# Patient Record
Sex: MALE | Race: WHITE | NOT HISPANIC OR LATINO | Employment: FULL TIME | URBAN - METROPOLITAN AREA
[De-identification: names, ages, dates, MRNs, and addresses within clinical notes are randomized per-mention and may not be internally consistent; named-entity substitution may affect disease eponyms.]

---

## 2020-05-28 ENCOUNTER — HOSPITAL ENCOUNTER (EMERGENCY)
Facility: MEDICAL CENTER | Age: 30
End: 2020-05-28
Attending: EMERGENCY MEDICINE

## 2020-05-28 VITALS
OXYGEN SATURATION: 100 % | BODY MASS INDEX: 23.66 KG/M2 | TEMPERATURE: 98.2 F | HEIGHT: 75 IN | SYSTOLIC BLOOD PRESSURE: 141 MMHG | HEART RATE: 97 BPM | RESPIRATION RATE: 16 BRPM | WEIGHT: 190.26 LBS | DIASTOLIC BLOOD PRESSURE: 71 MMHG

## 2020-05-28 DIAGNOSIS — L55.9 SUNBURN: ICD-10-CM

## 2020-05-28 PROCEDURE — 99282 EMERGENCY DEPT VISIT SF MDM: CPT

## 2020-05-28 NOTE — Clinical Note
Se Estevez was seen and treated in our emergency department on 5/28/2020.  He may return to work on 06/02/2020.       If you have any questions or concerns, please don't hesitate to call.      Josias Myers M.D.

## 2020-05-28 NOTE — ED TRIAGE NOTES
"Pt ambulatory to triage. Pt woke up this morning and noticed pain/ swelling in bilateral feet. Pt has significant sun burn and went to  this morning where he was dignosed with sun poisoning. He was told if swelling in his feet increased to come to ED. Describes pain as \"pins and needles\" when attempting to ambulated.       Pt/staff masked and in appropriate PPE during encounter.  Pt denies fever/travel or being in contact with anyone testing positive for COVID/Corona.  Explained to pt triage process, made pt aware to tell this RN/staff of any changes/concerns, pt verbalized understanding of process and instructions given. Pt to ER lobby.  "

## 2020-05-28 NOTE — Clinical Note
Se Estevez was seen and treated in our emergency department on 5/28/2020.  He may return to work on 06/02/2020.       If you have any questions or concerns, please don't hesitate to call.      Josias Meyrs M.D.

## 2020-05-29 NOTE — ED PROVIDER NOTES
"ED Provider Note    CHIEF COMPLAINT  Chief Complaint   Patient presents with   • Sent from Urgent Care   • Foot Swelling     bilateral feet x5 hours       HPI  Se Estevez is a 29 y.o. male who presents with sunburn.  He was sent from urgent care.  He has very faint swelling to his ankles and the urgent care provider instructed him to seek emergency care should he have any worsening.  The patient denies any blisters.  He states that he sat out in the sun for for 5 hours yesterday.  He states that the redness and pain is only on the anterior aspect of his body.  Was wearing a sun hat so did not involve his face.  He states that he works in [employer] and cannot wear boots currently due to pain.    REVIEW OF SYSTEMS  See HPI for further details. All other systems are negative.       SOCIAL HISTORY  Social History     Tobacco Use   • Smoking status: Current Every Day Smoker     Packs/day: 0.25   • Smokeless tobacco: Never Used   Substance and Sexual Activity   • Alcohol use: Not Currently   • Drug use: Not Currently   • Sexual activity: Not on file       SURGICAL HISTORY  patient denies any surgical history    CURRENT MEDICATIONS  Home Medications    **Home medications have not yet been reviewed for this encounter**         ALLERGIES  Not on File    PHYSICAL EXAM  VITAL SIGNS: /79   Pulse (!) 110   Temp 36.5 °C (97.7 °F) (Temporal)   Resp 18   Ht 1.905 m (6' 3\")   Wt 86.3 kg (190 lb 4.1 oz)   SpO2 93%   BMI 23.78 kg/m²   Pulse ox interpretation: I interpret this pulse ox as normal.  Constitutional: Alert in no apparent distress.  HENT: No signs of trauma, Bilateral external ears normal, Nose normal.   Eyes: Pupils are equal and reactive, Conjunctiva normal, Non-icteric.   Neck: Normal range of motion, No tenderness, Supple, No stridor.   Cardiovascular: Regular rate and rhythm.   Thorax & Lungs: Normal breath sounds, No respiratory distress  Skin: Warm, Dry, no blisters, No rash.  Significant " "erythema to the anterior chest and upper and lower extremities.  Extremities: Intact distal pulses, trace ankle edema, diffuse tenderness, No cyanosis  Neurologic: Alert, No focal deficits noted.       DIAGNOSTIC STUDIES / PROCEDURES      COURSE & MEDICAL DECISION MAKING    Medications - No data to display    Pertinent Labs & Imaging studies reviewed. (See chart for details)  29 y.o. male presenting with sunburn.  No blisters.  Appears to be typical sunburn.  Recommending supportive measures.  No indication for further emergency care at this time.  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    The patient was instructed to follow-up with primary care physician for further management.  To return immediately for any worsening symptoms or development of any other concerning signs or symptoms. The patient verbalizes understanding in their own words.    /71   Pulse 97   Temp 36.8 °C (98.2 °F)   Resp 16   Ht 1.905 m (6' 3\")   Wt 86.3 kg (190 lb 4.1 oz)   SpO2 100%   BMI 23.78 kg/m²     The patient was referred to primary care where they will receive further BP management.      [facility], Emergency Dept  [facility] [facility] [address] [phone]    As needed, If symptoms worsen    Primary care doctor    Schedule an appointment as soon as possible for a visit         FINAL IMPRESSION  1. Sunburn            Electronically signed by: Josias Myers M.D., 5/28/2020 5:00 PM    "

## 2020-05-29 NOTE — ED NOTES
Discharge instructions reviewed with pt. All questions answered and pt verbalizes understanding. Pt is ambulaory to exit

## 2020-11-10 ENCOUNTER — HOSPITAL ENCOUNTER (EMERGENCY)
Facility: MEDICAL CENTER | Age: 30
End: 2020-11-10
Attending: EMERGENCY MEDICINE

## 2020-11-10 VITALS
SYSTOLIC BLOOD PRESSURE: 108 MMHG | RESPIRATION RATE: 14 BRPM | BODY MASS INDEX: 23.82 KG/M2 | OXYGEN SATURATION: 98 % | HEIGHT: 75 IN | TEMPERATURE: 97.6 F | DIASTOLIC BLOOD PRESSURE: 71 MMHG | WEIGHT: 191.58 LBS | HEART RATE: 88 BPM

## 2020-11-10 DIAGNOSIS — M62.838 MUSCLE SPASM: ICD-10-CM

## 2020-11-10 DIAGNOSIS — M54.32 SCIATICA OF LEFT SIDE: ICD-10-CM

## 2020-11-10 DIAGNOSIS — S39.012A STRAIN OF LUMBAR REGION, INITIAL ENCOUNTER: ICD-10-CM

## 2020-11-10 PROCEDURE — A9270 NON-COVERED ITEM OR SERVICE: HCPCS | Performed by: EMERGENCY MEDICINE

## 2020-11-10 PROCEDURE — 700102 HCHG RX REV CODE 250 W/ 637 OVERRIDE(OP): Performed by: EMERGENCY MEDICINE

## 2020-11-10 PROCEDURE — 99283 EMERGENCY DEPT VISIT LOW MDM: CPT

## 2020-11-10 RX ORDER — CYCLOBENZAPRINE HCL 10 MG
10 TABLET ORAL 3 TIMES DAILY PRN
Qty: 30 TAB | Refills: 0 | Status: SHIPPED | OUTPATIENT
Start: 2020-11-10

## 2020-11-10 RX ORDER — DIAZEPAM 5 MG/1
5 TABLET ORAL ONCE
Status: COMPLETED | OUTPATIENT
Start: 2020-11-10 | End: 2020-11-10

## 2020-11-10 RX ORDER — IBUPROFEN 600 MG/1
600 TABLET ORAL ONCE
Status: COMPLETED | OUTPATIENT
Start: 2020-11-10 | End: 2020-11-10

## 2020-11-10 RX ORDER — LIDOCAINE 50 MG/G
1 PATCH TOPICAL EVERY 24 HOURS
Qty: 10 PATCH | Refills: 0 | Status: SHIPPED | OUTPATIENT
Start: 2020-11-10

## 2020-11-10 RX ORDER — IBUPROFEN 600 MG/1
600 TABLET ORAL EVERY 6 HOURS PRN
Qty: 30 TAB | Refills: 0 | Status: SHIPPED | OUTPATIENT
Start: 2020-11-10

## 2020-11-10 RX ADMIN — IBUPROFEN 600 MG: 600 TABLET, FILM COATED ORAL at 21:20

## 2020-11-10 RX ADMIN — DIAZEPAM 5 MG: 5 TABLET ORAL at 21:20

## 2020-11-10 ASSESSMENT — LIFESTYLE VARIABLES: DO YOU DRINK ALCOHOL: NO

## 2020-11-10 ASSESSMENT — PAIN DESCRIPTION - PAIN TYPE
TYPE: ACUTE PAIN
TYPE: ACUTE PAIN

## 2020-11-10 NOTE — Clinical Note
Se Estevez was seen and treated in our emergency department on 11/10/2020.  He may return to work on 11/12/2020.  Recommend light duty for one week.      If you have any questions or concerns, please don't hesitate to call.      Jozef Cummings M.D.

## 2020-11-11 NOTE — ED TRIAGE NOTES
Se Estevez  30 y.o.  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

## 2020-11-11 NOTE — ED PROVIDER NOTES
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   HPI  Se Estevez is a 30 y.o. male who presents to the Emergency Department with back pain.  The patient notes having intermittent back pain for the past few years.  He moved here from [facility].  He had a full work-up there to include MRI, x-rays.  He was evaluated by several neurosurgeons and there was some discussion about exploring surgery but the patient declined this and went for physical therapy.  He notes doing physical therapy with significant improvement in his symptoms.  He last had a flare about 6 months ago.  Most recent flareup occurred 48 hours ago when the patient was getting up from the couch.  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  He notes he has some spasming and burning pain in the left side of his back that radiates down towards his foot, it also occasionally radiates up towards his mid back.  It is worse with movement and slightly alleviated with rest.  He has been doing home stretching, taking Naprosyn as well as using hot and cold packs and lidocaine rubs.  He notes some improvement in the symptoms.  He is hoping to get a day off of work and some light duty to help mitigate the pain.  He does smoke cigarettes.    He denies any numbness or focal weakness.  He denies any incontinence.    REVIEW OF SYSTEMS  See  "HPI,   Remainder of ROS negative.     PAST MEDICAL HISTORY   Denies    SURGICAL HISTORY  patient denies any surgical history    SOCIAL HISTORY  Social History     Tobacco Use   • Smoking status: Current Every Day Smoker     Packs/day: 0.25   • Smokeless tobacco: Never Used   Substance Use Topics   • Alcohol use: Not Currently   • Drug use: Not Currently      Social History     Substance and Sexual Activity   Drug Use Not Currently       FAMILY HISTORY  History reviewed. No pertinent family history.    CURRENT MEDICATIONS  Reviewed.  See Encounter Summary.     ALLERGIES  Allergies   Allergen Reactions   • Amoxicillin Hives   • Augmentin Hives       PHYSICAL EXAM  VITAL SIGNS: /71   Pulse 88   Temp 36.4 °C (97.6 °F) (Temporal)   Resp 14   Ht 1.905 m (6' 3\")   Wt 86.9 kg (191 lb 9.3 oz)   SpO2 98%   BMI 23.95 kg/m²   Constitutional: Awake, alert in no apparent distress.  HENT: Normocephalic, Bilateral external ears normal. Nose normal.   Eyes: Conjunctiva normal, non-icteric, EOMI.    Thorax & Lungs: Easy unlabored respirations, Clear to ascultation bilaterally.  Cardiovascular: Regular rate, Regular rhythm, No murmurs, rubs or gallops. Bilateral pulses symmetrical.   Abdomen:  Soft, nontender, nondistended, normal active bowel sounds.   :    Skin: Visualized skin is  Dry, No erythema, No rash.   Musculoskeletal:   No cyanosis, clubbing or edema. No leg asymmetry.  Back is normal in appearance without any midline tenderness or step-off.  Neurologic: Alert, Grossly non-focal.  No saddle paresthesias, patient able to ambulate though he does have a slight limp in his gait as he is favoring the right leg.  Psychiatric: Normal affect, Normal mood  Lymphatic:  No cervical LAD        RADIOLOGY  No orders to display         COURSE & MEDICAL DECISION MAKING  Pertinent Labs & Imaging studies reviewed. (See chart for details)      8:47 PM - Medical record reviewed, patient seen and examined at bedside.    Decision " Making:  This is a pleasant well-appearing 30-year-old male who presents with back pain.  There is no significant antecedent trauma. Imaging is not indicated at this time. I do not suspect acute spinal fracture. There is no evidence of cauda equina. The patient is neurologically intact. The abdomen is soft without abdominal masses. I do not suspect AAA. There are no urinary symptoms or CVA tenderness. I do not suspect renal colic or pyelonephritis.    I counseled the patient extensively on the typical course for back pain. I recommend anti-inflammatories and physical therapy.      Discharge Medications:  New Prescriptions    CYCLOBENZAPRINE (FLEXERIL) 10 MG TAB    Take 1 Tab by mouth 3 times a day as needed.    IBUPROFEN (MOTRIN) 600 MG TAB    Take 1 Tab by mouth every 6 hours as needed.    LIDOCAINE (LIDODERM) 5 % PATCH    Apply 1 Patch to skin as directed every 24 hours.       The patient was discharged home (see d/c instructions) was told to return immediately for any signs or symptoms listed, or any worsening at all.  The patient verbally agreed to the discharge precautions and follow-up plan which is documented in EPIC.        FINAL IMPRESSION  1. Strain of lumbar region, initial encounter    2. Sciatica of left side    3. Muscle spasm

## 2020-11-11 NOTE — ED NOTES
Se Estevez discharged via ambulation to lobby, friend reportedly driving home,  Discharge instructions given and reviewed, patient educated to follow up with PCP and physiatry, verbalized understanding.  Prescriptions given x 3 for electronic pickup, verbalized understanding.  All personal belongings in possession.  No questions at this time.

## 2020-12-27 ENCOUNTER — APPOINTMENT (OUTPATIENT)
Dept: RADIOLOGY | Facility: MEDICAL CENTER | Age: 30
End: 2020-12-27
Attending: EMERGENCY MEDICINE

## 2020-12-27 ENCOUNTER — HOSPITAL ENCOUNTER (EMERGENCY)
Facility: MEDICAL CENTER | Age: 30
End: 2020-12-27
Attending: EMERGENCY MEDICINE

## 2020-12-27 VITALS
HEART RATE: 90 BPM | WEIGHT: 180.78 LBS | SYSTOLIC BLOOD PRESSURE: 122 MMHG | TEMPERATURE: 100.5 F | DIASTOLIC BLOOD PRESSURE: 73 MMHG | HEIGHT: 75 IN | BODY MASS INDEX: 22.48 KG/M2 | OXYGEN SATURATION: 97 % | RESPIRATION RATE: 20 BRPM

## 2020-12-27 DIAGNOSIS — K02.9 PAIN DUE TO DENTAL CARIES: ICD-10-CM

## 2020-12-27 LAB
COVID ORDER STATUS COVID19: NORMAL
SARS-COV+SARS-COV-2 AG RESP QL IA.RAPID: NOTDETECTED
SPECIMEN SOURCE: NORMAL

## 2020-12-27 PROCEDURE — 700111 HCHG RX REV CODE 636 W/ 250 OVERRIDE (IP): Mod: EDC | Performed by: EMERGENCY MEDICINE

## 2020-12-27 PROCEDURE — 700101 HCHG RX REV CODE 250: Mod: EDC | Performed by: EMERGENCY MEDICINE

## 2020-12-27 PROCEDURE — 99284 EMERGENCY DEPT VISIT MOD MDM: CPT | Mod: EDC

## 2020-12-27 PROCEDURE — 87426 SARSCOV CORONAVIRUS AG IA: CPT | Mod: EDC

## 2020-12-27 PROCEDURE — C9803 HOPD COVID-19 SPEC COLLECT: HCPCS | Mod: EDC | Performed by: EMERGENCY MEDICINE

## 2020-12-27 PROCEDURE — 41899 UNLISTED PX DENTALVLR STRUX: CPT | Mod: EDC

## 2020-12-27 PROCEDURE — 70355 PANORAMIC X-RAY OF JAWS: CPT

## 2020-12-27 RX ORDER — LIDOCAINE HYDROCHLORIDE AND EPINEPHRINE BITARTRATE 20; .01 MG/ML; MG/ML
10 INJECTION, SOLUTION SUBCUTANEOUS ONCE
Status: COMPLETED | OUTPATIENT
Start: 2020-12-27 | End: 2020-12-27

## 2020-12-27 RX ORDER — CLINDAMYCIN HYDROCHLORIDE 150 MG/1
300 CAPSULE ORAL 3 TIMES DAILY
Qty: 30 CAP | Refills: 0 | Status: SHIPPED | OUTPATIENT
Start: 2020-12-27 | End: 2021-01-01

## 2020-12-27 RX ORDER — HYDROCODONE BITARTRATE AND ACETAMINOPHEN 5; 325 MG/1; MG/1
1 TABLET ORAL EVERY 6 HOURS PRN
Qty: 12 TAB | Refills: 0 | Status: SHIPPED | OUTPATIENT
Start: 2020-12-27 | End: 2020-12-30

## 2020-12-27 RX ORDER — HYDROCODONE BITARTRATE AND ACETAMINOPHEN 5; 325 MG/1; MG/1
1 TABLET ORAL EVERY 6 HOURS PRN
Qty: 20 TAB | Refills: 0 | Status: SHIPPED | OUTPATIENT
Start: 2020-12-27 | End: 2020-12-27 | Stop reason: SDUPTHER

## 2020-12-27 RX ADMIN — LIDOCAINE HYDROCHLORIDE AND EPINEPHRINE 8 ML: 20; 10 INJECTION, SOLUTION INFILTRATION; PERINEURAL at 14:45

## 2020-12-27 RX ADMIN — FENTANYL CITRATE 50 MCG: 50 INJECTION, SOLUTION INTRAMUSCULAR; INTRAVENOUS at 11:58

## 2020-12-27 RX ADMIN — FENTANYL CITRATE 100 MCG: 50 INJECTION, SOLUTION INTRAMUSCULAR; INTRAVENOUS at 13:47

## 2020-12-27 NOTE — ED NOTES
"Pt ambulatory to Peds 41. Agree with triage RN note. Instructed to change into gown. Pt awake and alert. Reports L upper, rear dental pain. States he \"felt my tooth break off yesterday\" and has since had severe pain. Pt states he was seen by a dentist earlier in the year, but was unable to afford extraction. Denies fevers or vomiting. Reports mild nausea r/t pain. Family at bedside. Call light within reach. Denies additional needs. Up for ERP eval.    "

## 2020-12-27 NOTE — ED NOTES
Pt called RN in room to say the pain is worsening than before. Medication given for pain. Family aware of POC. All questions and concerns addressed.

## 2020-12-27 NOTE — ED NOTES
Se Estevez D/C'anai.  Discharge instructions including s/s to return to ED, follow up appointments, hydration importance and dental pain provided to pt/family.    Parents verbalized understanding with no further questions and concerns.    Copy of discharge provided to pt/lata.  Signed copy in chart.    Prescription for norco/ clindamycin provided to pt.   Pt walked out of department; pt in NAD, awake, alert, interactive and age appropriate.

## 2020-12-27 NOTE — ED PROVIDER NOTES
"ED Provider Note    CHIEF COMPLAINT  Chief Complaint   Patient presents with   • Dental Pain     Impacted wisdom tooth X years, states yesterday 'the whole top of it cracked', pt in obvious pain at triage. Pt taking ibupfrofen and orajel at home. Pain 10/10. Pt denies s/sx of abscess.        HPI  Se Estevez is a 30 y.o. male who presents with dental pain.  The patient has a known fracture and decay to the left upper molar that is impacted.  He states he has had severe pain over the last couple of days.  He states his been a periodic problem and he has no insurance for dental follow-up.  He does not have any facial swelling no difficulty with breathing or swallowing.  He has not had any recent fevers.  He has severe pain.  The pain is localized to the left upper molar.    REVIEW OF SYSTEMS  No vomiting, no fever, no difficulty with breathing    PHYSICAL EXAM  VITAL SIGNS: /82   Pulse (!) 102   Temp 37.4 °C (99.4 °F) (Temporal)   Resp 18   Ht 1.905 m (6' 3\")   Wt 82 kg (180 lb 12.4 oz)   SpO2 99%   BMI 22.60 kg/m²   In general the patient appears uncomfortable    Facial examination he has no significant swelling    Oral cavity the patient does have a significant decayed left upper molar that is partially fractured with tenderness with percussion.  There is no gingival fluctuance    Neck is supple without adenopathy    Pulmonary chest clear to auscultation bilaterally    Cardiovascular S1-S2 with a slightly tachycardic rate    RADIOLOGY/PROCEDURES  [id number]   Final Result      1.  No evidence of fracture.      2.  Dental caries involving the left posterior maxillary molar.            COURSE & MEDICAL DECISION MAKING  Pertinent Labs & Imaging studies reviewed. (See chart for details)  This a 30-year-old male who presents with dentalgia I suspect this is from the fractured posterior maxillary molar.  I did speak with the oral surgeon regarding resection.  He wanted a Panorex and he " states he will reviewed the films and determine if resection the emergency department, operating room, or outpatient arena would be appropriate.  At this time do not see any evidence of infection.  The patient initially received 50 mcg of fentanyl intranasally and I did have a rebolused him with 100 mcg.    FINAL IMPRESSION  1.  Dentalgia       Electronically signed by: Rafita Koch M.D., 12/27/2020 11:36 AM

## 2020-12-27 NOTE — ED PROVIDER NOTES
ED Provider Note    This is an addendum to the note by Dr. Koch.    Just prior to the arrival of the oral surgeon the patient complained that he was hot and check of his oral temperature showed a value of 100.5.  He says he has not recently been ill except for his problem with his tooth has had no respiratory symptoms.  A rapid Covid test was sent because he was about to undergo a dental procedure and this result came back negative.  The patient was seen by the oral surgeon and his tooth extracted and he is feeling much better.  I have reviewed the case with the oral surgeon and written prescriptions for Norco and clindamycin for the patient after reviewing risks and benefits.  The patient is instructed to follow-up with a dentist as soon as possible if he develops new or worsening symptoms or has other symptoms or needs emergency medical care he is to return here at once for recheck otherwise if he has any problems related to this extraction he is to call Dr. Jean's office and finally he has been instructed to continue his efforts to see a regular dentist.

## 2020-12-27 NOTE — ED TRIAGE NOTES
"Chief Complaint   Patient presents with   • Dental Pain     Impacted wisdom tooth X years, states yesterday 'the whole top of it cracked', pt in obvious pain at triage. Pt taking ibupfrofen and orajel at home. Pain 10/10. Pt denies s/sx of abscess.      Pt arrives with friend to triage, VSS on RA, GCS 15, obvious discomfort/pain r/t above complaint.     Pt returned to lobby. Educated on triage process and to inform staff of any changes.     Denies all s/sx of covid, denies recent travel, denies fevers.    /82   Pulse (!) 102   Temp 37.4 °C (99.4 °F) (Temporal)   Resp 18   Ht 1.905 m (6' 3\")   Wt 82 kg (180 lb 12.4 oz)   SpO2 99%   BMI 22.60 kg/m²   "

## 2020-12-27 NOTE — PROCEDURES
Procedure note     Pre-op dx - carious/nonrestorbable #16   Post op dx - same     procedure performed - surgical ext #16     Anesthetize lidocaine with epi 8 cc   FTMPF   Elevate and deliver #16   No issues     POIG V/W     F/U PRN     Miranda

## 2020-12-27 NOTE — DISCHARGE INSTRUCTIONS
Return here if you have new or worsening symptoms or high fever.  Follow-up with a dentist as soon as possible.  If you have any problems related to this procedure call Dr. Jean's office